# Patient Record
Sex: MALE | Race: AMERICAN INDIAN OR ALASKA NATIVE | NOT HISPANIC OR LATINO | URBAN - METROPOLITAN AREA
[De-identification: names, ages, dates, MRNs, and addresses within clinical notes are randomized per-mention and may not be internally consistent; named-entity substitution may affect disease eponyms.]

---

## 2019-12-26 ENCOUNTER — EMERGENCY (EMERGENCY)
Facility: HOSPITAL | Age: 29
LOS: 1 days | Discharge: HOME | End: 2019-12-26
Attending: EMERGENCY MEDICINE | Admitting: EMERGENCY MEDICINE
Payer: COMMERCIAL

## 2019-12-26 VITALS
WEIGHT: 197.98 LBS | HEART RATE: 112 BPM | TEMPERATURE: 102 F | RESPIRATION RATE: 16 BRPM | OXYGEN SATURATION: 99 % | SYSTOLIC BLOOD PRESSURE: 119 MMHG | HEIGHT: 71 IN | DIASTOLIC BLOOD PRESSURE: 68 MMHG

## 2019-12-26 VITALS
RESPIRATION RATE: 17 BRPM | TEMPERATURE: 100 F | OXYGEN SATURATION: 100 % | SYSTOLIC BLOOD PRESSURE: 138 MMHG | DIASTOLIC BLOOD PRESSURE: 82 MMHG | HEART RATE: 116 BPM

## 2019-12-26 LAB
FLU A RESULT: SIGNIFICANT CHANGE UP
FLU A RESULT: SIGNIFICANT CHANGE UP
FLUAV AG NPH QL: SIGNIFICANT CHANGE UP
FLUBV AG NPH QL: DETECTED
RSV RESULT: SIGNIFICANT CHANGE UP
RSV RNA RESP QL NAA+PROBE: SIGNIFICANT CHANGE UP

## 2019-12-26 PROCEDURE — 99283 EMERGENCY DEPT VISIT LOW MDM: CPT | Mod: 25

## 2019-12-26 PROCEDURE — 87631 RESP VIRUS 3-5 TARGETS: CPT

## 2019-12-26 PROCEDURE — 71046 X-RAY EXAM CHEST 2 VIEWS: CPT | Mod: 26

## 2019-12-26 PROCEDURE — 99283 EMERGENCY DEPT VISIT LOW MDM: CPT

## 2019-12-26 PROCEDURE — 71046 X-RAY EXAM CHEST 2 VIEWS: CPT

## 2019-12-26 RX ORDER — FLUTICASONE PROPIONATE 50 MCG
2 SPRAY, SUSPENSION NASAL
Qty: 1 | Refills: 0
Start: 2019-12-26 | End: 2020-01-24

## 2019-12-26 RX ORDER — IBUPROFEN 200 MG
1 TABLET ORAL
Qty: 0 | Refills: 0 | DISCHARGE

## 2019-12-26 RX ORDER — GUAIFENESIN/PSEUDOEPHEDRNE HCL 1200-120MG
1 TABLET, EXTENDED RELEASE 12 HR ORAL
Qty: 14 | Refills: 0
Start: 2019-12-26 | End: 2020-01-01

## 2019-12-26 RX ORDER — ACETAMINOPHEN 500 MG
650 TABLET ORAL ONCE
Refills: 0 | Status: COMPLETED | OUTPATIENT
Start: 2019-12-26 | End: 2019-12-26

## 2019-12-26 RX ADMIN — Medication 650 MILLIGRAM(S): at 08:20

## 2019-12-26 NOTE — ED ADULT NURSE NOTE - OBJECTIVE STATEMENT
seen at urgent care and treated for flu and taking meds for flu but culture not done and coughing up green mucus and fever no loss of appetite

## 2019-12-26 NOTE — ED PROVIDER NOTE - ENMT, MLM
Airway patent, Nasal mucosa clear. Mouth with normal mucosa. nasal congestion noted, no tonsilar exudate bilateral TMs clear

## 2019-12-26 NOTE — ED ADULT NURSE NOTE - CHPI ED NUR SYMPTOMS NEG
no shortness of breath/no rash/no abdominal pain/no vomiting/no decreased eating/drinking/no diarrhea

## 2019-12-26 NOTE — ED PROVIDER NOTE - CLINICAL SUMMARY MEDICAL DECISION MAKING FREE TEXT BOX
Pt is a 28 yo male who presents to the ED with a cc of viral URI symptoms.  Pt with no significant past medical history.  Reports that symptoms began on Tuesday evening.  He reports that he developed a fever T max of 103 with associated chills, cold sweats, nausea, nasal congestion, and cough.  He reports that the cough is generally non productive but occasionally he noted light green sputum but he does believe that this is from upper nasal congestion.  He further reports diffuse myalgias.  Pt was seen at urgent care late on Tuesday and had blood work which was provided which was WNL.  he was diagnosed with flu and was started on Tamiflu, Tessalon pearls, and Motrin.  He reports continued fevers.  Denies visual changes, V/D/C, CP, SOB, abd pain.  Denies numbness or tingling in his ext. Pt did not have a flu vaccine this year .  Will swab for flu and obtain chest x-ray

## 2019-12-26 NOTE — ED PROVIDER NOTE - PATIENT PORTAL LINK FT
You can access the FollowMyHealth Patient Portal offered by [facility] by registering at the following website: [email or web address]. By joining [facility]’s FollowMyHealth portal, you will also be able to view your health information using other applications (apps) compatible with our system.

## 2019-12-26 NOTE — ED ADULT NURSE REASSESSMENT NOTE - NS ED NURSE REASSESS COMMENT FT1
pt re evaluated by md and to be d'c/d pt discharged stable and ambulatory in nad at present d/c instruction reinforced and pt verbalized understanding vital signs as charted pt advised he has the  flu  advised to cover cough  wash hands frequently avoid touching face and Tylenol for aches and pains and lots of fluids avoid sharing with others and if develop productive cough shortness of breath fever in 5 to 10 days follow up pmd because pt's have been developing pneumonia post flu also advised to return to ed for worsening s/s and pt verbalized understanding

## 2019-12-26 NOTE — ED PROVIDER NOTE - OBJECTIVE STATEMENT
Pt is a 30 yo male who presents to the ED with a cc of viral URI symptoms.  Pt with no significant past medical history.  Reports that symptoms began on Tuesday evening.  He reports that he developed a fever T max of 103 with associated chills, cold sweats, nausea, nasal congestion, and cough.  He reports that the cough is generally non productive but occasionally he noted light green sputum but he does believe that this is from upper nasal congestion.  He further reports diffuse myalgias.  Pt was seen at urgent care late on Tuesday and had blood work which was provided which was WNL.  he was diagnosed with flu and was started on Tamiflu, Tessalon pearls, and Motrin.  He reports continued fevers.  Denies visual changes, V/D/C, CP, SOB, abd pain.  Denies numbness or tingling in his ext. Pt did not have a flu vaccine this year

## 2019-12-26 NOTE — ED PROVIDER NOTE - NSFOLLOWUPINSTRUCTIONS_ED_ALL_ED_FT
Return to the ED for any new or worsening symptoms  Take your medication as prescribed  Continue your Tamiflu  Continue the cough medication   Alternate Tylenol and Motrin every 3 hours for your fever   Encourage increased liquid intake   Flonase 2 sprays to each nostril daily   Mucinex 1 tab 2 times a day take with a full glass of water  Advance activity as tolerated  Follow up with your PMD in 2-3 days for a recheck

## 2019-12-26 NOTE — ED ADULT TRIAGE NOTE - CHIEF COMPLAINT QUOTE
"I have fever, congestion & weakness for past 3 days" Michael at Urgent Care 2 days ago- rec meds & had labs
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
